# Patient Record
Sex: FEMALE | Race: WHITE | Employment: OTHER | URBAN - METROPOLITAN AREA
[De-identification: names, ages, dates, MRNs, and addresses within clinical notes are randomized per-mention and may not be internally consistent; named-entity substitution may affect disease eponyms.]

---

## 2025-01-29 ENCOUNTER — OFFICE VISIT (OUTPATIENT)
Age: 75
End: 2025-01-29
Payer: MEDICARE

## 2025-01-29 VITALS
TEMPERATURE: 97.4 F | HEIGHT: 62 IN | WEIGHT: 141.8 LBS | BODY MASS INDEX: 26.09 KG/M2 | DIASTOLIC BLOOD PRESSURE: 99 MMHG | SYSTOLIC BLOOD PRESSURE: 161 MMHG | OXYGEN SATURATION: 99 % | HEART RATE: 88 BPM

## 2025-01-29 DIAGNOSIS — R74.8 ABNORMAL LIVER ENZYMES: Primary | ICD-10-CM

## 2025-01-29 PROCEDURE — 1123F ACP DISCUSS/DSCN MKR DOCD: CPT | Performed by: STUDENT IN AN ORGANIZED HEALTH CARE EDUCATION/TRAINING PROGRAM

## 2025-01-29 PROCEDURE — 1036F TOBACCO NON-USER: CPT | Performed by: STUDENT IN AN ORGANIZED HEALTH CARE EDUCATION/TRAINING PROGRAM

## 2025-01-29 PROCEDURE — G8400 PT W/DXA NO RESULTS DOC: HCPCS | Performed by: STUDENT IN AN ORGANIZED HEALTH CARE EDUCATION/TRAINING PROGRAM

## 2025-01-29 PROCEDURE — 1090F PRES/ABSN URINE INCON ASSESS: CPT | Performed by: STUDENT IN AN ORGANIZED HEALTH CARE EDUCATION/TRAINING PROGRAM

## 2025-01-29 PROCEDURE — G8427 DOCREV CUR MEDS BY ELIG CLIN: HCPCS | Performed by: STUDENT IN AN ORGANIZED HEALTH CARE EDUCATION/TRAINING PROGRAM

## 2025-01-29 PROCEDURE — G8419 CALC BMI OUT NRM PARAM NOF/U: HCPCS | Performed by: STUDENT IN AN ORGANIZED HEALTH CARE EDUCATION/TRAINING PROGRAM

## 2025-01-29 PROCEDURE — 99203 OFFICE O/P NEW LOW 30 MIN: CPT | Performed by: STUDENT IN AN ORGANIZED HEALTH CARE EDUCATION/TRAINING PROGRAM

## 2025-01-29 PROCEDURE — 3017F COLORECTAL CA SCREEN DOC REV: CPT | Performed by: STUDENT IN AN ORGANIZED HEALTH CARE EDUCATION/TRAINING PROGRAM

## 2025-01-29 PROCEDURE — 1159F MED LIST DOCD IN RCRD: CPT | Performed by: STUDENT IN AN ORGANIZED HEALTH CARE EDUCATION/TRAINING PROGRAM

## 2025-01-29 RX ORDER — PRAVASTATIN SODIUM 10 MG
10 TABLET ORAL DAILY
COMMUNITY
Start: 2025-01-02

## 2025-01-29 RX ORDER — RABEPRAZOLE SODIUM 20 MG/1
90 TABLET, DELAYED RELEASE ORAL
COMMUNITY

## 2025-01-29 RX ORDER — VALACYCLOVIR HYDROCHLORIDE 1 G/1
TABLET, FILM COATED ORAL
COMMUNITY
Start: 2024-11-25

## 2025-01-29 RX ORDER — URSODIOL 300 MG/1
180 CAPSULE ORAL
COMMUNITY
Start: 2024-03-25

## 2025-01-29 RX ORDER — LEVOTHYROXINE SODIUM 75 MCG
75 TABLET ORAL EVERY MORNING
COMMUNITY
Start: 2024-12-23

## 2025-01-29 RX ORDER — LEVOTHYROXINE SODIUM 88 UG/1
TABLET ORAL
COMMUNITY

## 2025-01-29 ASSESSMENT — ANXIETY QUESTIONNAIRES
2. NOT BEING ABLE TO STOP OR CONTROL WORRYING: NOT AT ALL
1. FEELING NERVOUS, ANXIOUS, OR ON EDGE: NOT AT ALL
5. BEING SO RESTLESS THAT IT IS HARD TO SIT STILL: NOT AT ALL
IF YOU CHECKED OFF ANY PROBLEMS ON THIS QUESTIONNAIRE, HOW DIFFICULT HAVE THESE PROBLEMS MADE IT FOR YOU TO DO YOUR WORK, TAKE CARE OF THINGS AT HOME, OR GET ALONG WITH OTHER PEOPLE: NOT DIFFICULT AT ALL
GAD7 TOTAL SCORE: 0
7. FEELING AFRAID AS IF SOMETHING AWFUL MIGHT HAPPEN: NOT AT ALL
3. WORRYING TOO MUCH ABOUT DIFFERENT THINGS: NOT AT ALL
4. TROUBLE RELAXING: NOT AT ALL
6. BECOMING EASILY ANNOYED OR IRRITABLE: NOT AT ALL

## 2025-01-29 ASSESSMENT — PATIENT HEALTH QUESTIONNAIRE - PHQ9
SUM OF ALL RESPONSES TO PHQ QUESTIONS 1-9: 0
SUM OF ALL RESPONSES TO PHQ QUESTIONS 1-9: 0
SUM OF ALL RESPONSES TO PHQ9 QUESTIONS 1 & 2: 0
DEPRESSION UNABLE TO ASSESS: FUNCTIONAL CAPACITY MOTIVATION LIMITS ACCURACY
2. FEELING DOWN, DEPRESSED OR HOPELESS: NOT AT ALL
SUM OF ALL RESPONSES TO PHQ QUESTIONS 1-9: 0
1. LITTLE INTEREST OR PLEASURE IN DOING THINGS: NOT AT ALL
SUM OF ALL RESPONSES TO PHQ QUESTIONS 1-9: 0

## 2025-01-29 NOTE — PROGRESS NOTES
Chief Complaint   Patient presents with    New Patient     N/A     Vitals:    01/29/25 0856   BP: (!) 161/99   Site: Right Upper Arm   Position: Sitting   Pulse: 88   Temp: 97.4 °F (36.3 °C)   TempSrc: Temporal   SpO2: 99%   Weight: 64.3 kg (141 lb 12.8 oz)   Height: 1.575 m (5' 2\")     .  \"Have you been to the ER, urgent care clinic since your last visit?  Hospitalized since your last visit?\"    NO    “Have you seen or consulted any other health care providers outside of [facility] since your last visit?”    NO    Have you had a mammogram?”   YES - Where: [facility] Nurse/CMA to request most recent records if not in the chart    No breast cancer screening on file         “Have you had a colorectal cancer screening such as a colonoscopy/FIT/Cologuard?    YES - Type: Colonoscopy - Where: with in the past 3 years  Nurse/CMA to request most recent records if not in the chart     No colonoscopy on file  No cologuard on file  No FIT/FOBT on file   No flexible sigmoidoscopy on file         Click Here for Release of Records Request    
[facility] LIVER [facility]     Mason Duque MD, FACP, MACG, FAASLD   MD Flory Banda PA-C April S Ashworth, [facility]-BC   Radha Erazo, [facility]   Alesiaqian Wilson, FNP-C  Cachorro Babin, [facility]-C   Jaylyn Barrientos, [facility]         Liver [facility]   at [facility]   [address], [address]   [place]  [address] [phone]   FAX: [phone]  [facility]   [address], [address]   [place]  [address] [phone]   FAX: [phone]     Gal humberto    Patient Care Team:  Jose Manuel Parikh MD as PCP - General (Gastroenterology)    The clinicians listed above have asked me to see Arnaud Covarrubias in consultation regarding elevated liver enzymes and its management.      All medical records sent by the referring physicians were reviewed including available imaging studies and pathology.        There is no problem list on file for this patient.        ASSESSMENT AND PLAN:    73 yo female with pmhx of asthma, gerd, hypothyroidism, HLD, steatotic liver disease and primary biliary cholangitis.    Primary Biliary Cholangitis  The diagnosis is based upon liver biopsy, a positive AMA and an elevation in ALP.  Many patients with PBC have a positive ORIN in addition to AMA.  A liver biopsy was completed in November 2024 which showed mild bile duct injury and no florid duct lesion (report will be scanned into media)  She had labs two weeks ago and results were reviewed together in clinic today.   She was started on ursodiol in March 2024. Initially prescribed 600 mg but after two weeks, she reduced the dose to 300 mg on her own due to diarrhea. Her alk phos has remained elevated on this dose.     Ms Lares is anti medication. She would like to be off all medications. She is concerned about side effects. We discussed that the 
disease.***    SURGICAL HISTORY:    SOCIAL HISTORY:  Tobacco-.  Never smoker  Alcohol-less than 7 times per week  Drug-none    Lives with  Children-  Works as-    PHYSICAL EXAMINATION:  There were no vitals taken for this visit.  General: No acute distress.   Eyes: Sclera anicteric.   ENT: No oral lesions.  Thyroid normal.  Nodes: No adenopathy.   Skin: No spider angiomata.  No jaundice.  No palmar erythema.  Respiratory: Lungs clear to auscultation.   Cardiovascular: Regular heart rate.  No murmurs.  No JVD.  Abdomen: Soft non-tender.  Liver size normal to percussion/palpation.  Spleen not palpable. No obvious ascites.  Extremities: No edema.  No muscle wasting.  No gross arthritic changes.  Neurologic: Alert and oriented.  Cranial nerves grossly intact.  No asterixis.    LABORATORY STUDIES:  10/16/2024  Albumin 4.5, T. bili 1.2, alkaline phosphatase 151, AST 25, ALT 15    6/10/2024 alk phos 146  1/5/2024 alk phos 182  11/29/2023 alk phos 160    2024 alk phos of 5.5, hemoglobin 14, platelet 354    3/18/2024 alpha-fetoprotein 3.0, CA 19-9 9,    SEROLOGIES:  10 /16/2024  Smooth muscle antibody 45 (high), mitochondrial antibody 186 (high)    ORIN negative, ceruloplasmin 24 bilirubin 9 point    7/10/2023  Hepatitis A IgM negative, hepatitis B surface antigen negative, hepatitis B core IgM negative, hepatitis C antibody nonreactive    LIVER HISTOLOGY:  Not available or performed    ENDOSCOPIC PROCEDURES:  Colonoscopy 2/6/2019-diverticulosis, subcentimeter polyp removed in the transverse colon.  EGD pending 2/6/2019: Hiatal hernia, esophagitis, nodularity of the antrum    RADIOLOGY:  CT abdomen with and without IV contrast 7/13/2023  The liver is normal in size there are no focal liver lesions, the spleen is normal in size with no focal liver lesions  Impression enlarged jamie hepatis adenopathy, indeterminate.  Differentials include reactive lymphoproliferative disorder or sequela of hepatocellular dysfunction

## 2025-04-01 DIAGNOSIS — R74.8 ABNORMAL LIVER ENZYMES: ICD-10-CM

## 2025-04-02 LAB
ALBUMIN SERPL-MCNC: 3.8 G/DL (ref 3.5–5)
ALBUMIN/GLOB SERPL: 0.9 (ref 1.1–2.2)
ALP SERPL-CCNC: 149 U/L (ref 45–117)
ALT SERPL-CCNC: 19 U/L (ref 12–78)
ANION GAP SERPL CALC-SCNC: 6 MMOL/L (ref 2–12)
AST SERPL-CCNC: 24 U/L (ref 15–37)
BASOPHILS # BLD: 0.05 K/UL (ref 0–0.1)
BASOPHILS NFR BLD: 0.9 % (ref 0–1)
BILIRUB DIRECT SERPL-MCNC: 0.3 MG/DL (ref 0–0.2)
BILIRUB SERPL-MCNC: 1.3 MG/DL (ref 0.2–1)
BUN SERPL-MCNC: 10 MG/DL (ref 6–20)
BUN/CREAT SERPL: 15 (ref 12–20)
CALCIUM SERPL-MCNC: 9.8 MG/DL (ref 8.5–10.1)
CHLORIDE SERPL-SCNC: 104 MMOL/L (ref 97–108)
CHOLEST SERPL-MCNC: 223 MG/DL
CO2 SERPL-SCNC: 29 MMOL/L (ref 21–32)
CREAT SERPL-MCNC: 0.65 MG/DL (ref 0.55–1.02)
DIFFERENTIAL METHOD BLD: NORMAL
EOSINOPHIL # BLD: 0.22 K/UL (ref 0–0.4)
EOSINOPHIL NFR BLD: 3.9 % (ref 0–7)
ERYTHROCYTE [DISTWIDTH] IN BLOOD BY AUTOMATED COUNT: 13 % (ref 11.5–14.5)
GLOBULIN SER CALC-MCNC: 4.1 G/DL (ref 2–4)
GLUCOSE SERPL-MCNC: 94 MG/DL (ref 65–100)
HCT VFR BLD AUTO: 45.8 % (ref 35–47)
HDLC SERPL-MCNC: 61 MG/DL
HDLC SERPL: 3.7 (ref 0–5)
HGB BLD-MCNC: 14.5 G/DL (ref 11.5–16)
IMM GRANULOCYTES # BLD AUTO: 0.03 K/UL (ref 0–0.04)
IMM GRANULOCYTES NFR BLD AUTO: 0.5 % (ref 0–0.5)
LDLC SERPL CALC-MCNC: 133.6 MG/DL (ref 0–100)
LYMPHOCYTES # BLD: 2.39 K/UL (ref 0.8–3.5)
LYMPHOCYTES NFR BLD: 42.6 % (ref 12–49)
MCH RBC QN AUTO: 30 PG (ref 26–34)
MCHC RBC AUTO-ENTMCNC: 31.7 G/DL (ref 30–36.5)
MCV RBC AUTO: 94.6 FL (ref 80–99)
MONOCYTES # BLD: 0.64 K/UL (ref 0–1)
MONOCYTES NFR BLD: 11.4 % (ref 5–13)
NEUTS SEG # BLD: 2.28 K/UL (ref 1.8–8)
NEUTS SEG NFR BLD: 40.7 % (ref 32–75)
NRBC # BLD: 0 K/UL (ref 0–0.01)
NRBC BLD-RTO: 0 PER 100 WBC
PLATELET # BLD AUTO: 267 K/UL (ref 150–400)
PMV BLD AUTO: 12.3 FL (ref 8.9–12.9)
POTASSIUM SERPL-SCNC: 4.4 MMOL/L (ref 3.5–5.1)
PROT SERPL-MCNC: 7.9 G/DL (ref 6.4–8.2)
RBC # BLD AUTO: 4.84 M/UL (ref 3.8–5.2)
SODIUM SERPL-SCNC: 139 MMOL/L (ref 136–145)
TRIGL SERPL-MCNC: 142 MG/DL
VLDLC SERPL CALC-MCNC: 28.4 MG/DL
WBC # BLD AUTO: 5.6 K/UL (ref 3.6–11)

## 2025-04-03 LAB
MITOCHONDRIA M2 IGG SER-ACNC: 141.3 UNITS (ref 0–20)
SMA IGG SER-ACNC: 34 UNITS (ref 0–19)

## 2025-04-08 DIAGNOSIS — K74.3 PRIMARY BILIARY CHOLANGITIS (HCC): Primary | ICD-10-CM

## 2025-04-08 NOTE — PROGRESS NOTES
She called to discuss results.  She is not tolerating ursodiol  Prescription for seladelpar sent to [facility]. Follow up in July as scheduled.

## 2025-04-10 LAB
IGA SERPL-MCNC: 436 MG/DL (ref 64–422)
IGE SERPL-ACNC: 311 IU/ML (ref 6–495)
IGG SERPL-MCNC: 1261 MG/DL (ref 586–1602)
IGM SERPL-MCNC: 436 MG/DL (ref 26–217)

## 2025-04-11 LAB
ANA BY IFA: NEGATIVE
ANTI SSA: <20 UNITS

## 2025-04-18 DIAGNOSIS — K74.3 PRIMARY BILIARY CHOLANGITIS (HCC): Primary | ICD-10-CM

## 2025-04-23 DIAGNOSIS — K74.3 PRIMARY BILIARY CHOLANGITIS (HCC): ICD-10-CM

## 2025-05-19 ENCOUNTER — OFFICE VISIT (OUTPATIENT)
Age: 75
End: 2025-05-19
Payer: MEDICARE

## 2025-05-19 VITALS
WEIGHT: 135.2 LBS | HEART RATE: 79 BPM | HEIGHT: 62 IN | OXYGEN SATURATION: 99 % | SYSTOLIC BLOOD PRESSURE: 140 MMHG | DIASTOLIC BLOOD PRESSURE: 93 MMHG | BODY MASS INDEX: 24.88 KG/M2 | TEMPERATURE: 97.5 F

## 2025-05-19 DIAGNOSIS — K74.3 PRIMARY BILIARY CHOLANGITIS (HCC): Primary | ICD-10-CM

## 2025-05-19 PROCEDURE — 91200 LIVER ELASTOGRAPHY: CPT | Performed by: STUDENT IN AN ORGANIZED HEALTH CARE EDUCATION/TRAINING PROGRAM

## 2025-05-19 PROCEDURE — 1159F MED LIST DOCD IN RCRD: CPT | Performed by: STUDENT IN AN ORGANIZED HEALTH CARE EDUCATION/TRAINING PROGRAM

## 2025-05-19 PROCEDURE — 1126F AMNT PAIN NOTED NONE PRSNT: CPT | Performed by: STUDENT IN AN ORGANIZED HEALTH CARE EDUCATION/TRAINING PROGRAM

## 2025-05-19 PROCEDURE — 1123F ACP DISCUSS/DSCN MKR DOCD: CPT | Performed by: STUDENT IN AN ORGANIZED HEALTH CARE EDUCATION/TRAINING PROGRAM

## 2025-05-19 PROCEDURE — G8400 PT W/DXA NO RESULTS DOC: HCPCS | Performed by: STUDENT IN AN ORGANIZED HEALTH CARE EDUCATION/TRAINING PROGRAM

## 2025-05-19 PROCEDURE — 1036F TOBACCO NON-USER: CPT | Performed by: STUDENT IN AN ORGANIZED HEALTH CARE EDUCATION/TRAINING PROGRAM

## 2025-05-19 PROCEDURE — 1090F PRES/ABSN URINE INCON ASSESS: CPT | Performed by: STUDENT IN AN ORGANIZED HEALTH CARE EDUCATION/TRAINING PROGRAM

## 2025-05-19 PROCEDURE — 99214 OFFICE O/P EST MOD 30 MIN: CPT | Performed by: STUDENT IN AN ORGANIZED HEALTH CARE EDUCATION/TRAINING PROGRAM

## 2025-05-19 PROCEDURE — 3017F COLORECTAL CA SCREEN DOC REV: CPT | Performed by: STUDENT IN AN ORGANIZED HEALTH CARE EDUCATION/TRAINING PROGRAM

## 2025-05-19 PROCEDURE — G8420 CALC BMI NORM PARAMETERS: HCPCS | Performed by: STUDENT IN AN ORGANIZED HEALTH CARE EDUCATION/TRAINING PROGRAM

## 2025-05-19 PROCEDURE — G8427 DOCREV CUR MEDS BY ELIG CLIN: HCPCS | Performed by: STUDENT IN AN ORGANIZED HEALTH CARE EDUCATION/TRAINING PROGRAM

## 2025-05-19 RX ORDER — URSODIOL 250 MG/1
250 TABLET, FILM COATED ORAL 2 TIMES DAILY
Qty: 90 TABLET | Refills: 3 | Status: SHIPPED | OUTPATIENT
Start: 2025-05-19

## 2025-05-19 ASSESSMENT — PATIENT HEALTH QUESTIONNAIRE - PHQ9
SUM OF ALL RESPONSES TO PHQ QUESTIONS 1-9: 0
SUM OF ALL RESPONSES TO PHQ QUESTIONS 1-9: 0
1. LITTLE INTEREST OR PLEASURE IN DOING THINGS: NOT AT ALL
DEPRESSION UNABLE TO ASSESS: FUNCTIONAL CAPACITY MOTIVATION LIMITS ACCURACY
SUM OF ALL RESPONSES TO PHQ QUESTIONS 1-9: 0
SUM OF ALL RESPONSES TO PHQ QUESTIONS 1-9: 0
2. FEELING DOWN, DEPRESSED OR HOPELESS: NOT AT ALL

## 2025-05-19 NOTE — PROGRESS NOTES
Chief Complaint   Patient presents with    Follow-up     Vitals:    05/19/25 1213   BP: (!) 140/93   Pulse: 79   Temp: 97.5 °F (36.4 °C)   SpO2: 99%     Have you been to the ER, urgent care clinic since your last visit?  Hospitalized since your last visit?   NO    Have you seen or consulted any other health care providers outside our system since your last visit?   NO    Have you had a mammogram?”   NO    No breast cancer screening on file       “Have you had a colorectal cancer screening such as a colonoscopy/FIT/Cologuard?    NO    No colonoscopy on file  No cologuard on file  No FIT/FOBT on file   No flexible sigmoidoscopy on file            
[employer].    LABORATORY STUDIES:  1/2025  Albumin 4.2, alkaline phosphatase 129, alt 14, ast 20     10/16/2024  Albumin 4.5, T. bili 1.2, alkaline phosphatase 151, AST 25, ALT 15    6/10/2024 alk phos 146  1/5/2024 alk phos 182  11/29/2023 alk phos 160    2024 alk phos of 5.5, hemoglobin 14, platelet 354    3/18/2024 alpha-fetoprotein 3.0, CA 19-9 9,    7/7/2023  Alp 312, ast 703, alt 690, t. Bili 1.6    SEROLOGIES:  10 /16/2024  Smooth muscle antibody 45 (high), mitochondrial antibody 186 (high)    ORIN negative, ceruloplasmin 24 bilirubin 9 point    7/10/2023  Hepatitis A IgM negative, hepatitis B surface antigen negative, hepatitis B core IgM negative, hepatitis C antibody nonreactive    LIVER HISTOLOGY:  Liver biopsy November 2024. Results shared with me during clinic visit    ENDOSCOPIC PROCEDURES:  Colonoscopy 2/6/2019-diverticulosis, subcentimeter polyp removed in the transverse colon.  EGD pending 2/6/2019: Hiatal hernia, esophagitis, nodularity of the antrum    RADIOLOGY:  CT abdomen with and without IV contrast 7/13/2023  The liver is normal in size there are no focal liver lesions, the spleen is normal in size with no focal liver lesions  Impression enlarged jamie hepatis adenopathy, indeterminate.  Differentials include reactive lymphoproliferative disorder or sequela of hepatocellular dysfunction recommend consideration for repeat scan in 3 to 6 months.    Ultrasound 7/14/2023  There is minimal hepatic steatosis    FIBROSIS ASSESSMENT  Periportal fibrosis on biopsy in 11/2024    Fibroscan 5/19/2025:  LSM 4.9 kPa; ; IQR 11%    OTHER TESTING:  Not available or performed      FOLLOW-UP:  All of the issues listed above in the Assessment and Plan were discussed with the patient.  All questions were answered.  The patient expressed a clear understanding of the above.    Follow-up Liver [facility] in 6 months for routine monitoring of her alk phos.       Mercedes Lim MD

## 2025-05-21 ENCOUNTER — PATIENT MESSAGE (OUTPATIENT)
Age: 75
End: 2025-05-21

## 2025-05-21 DIAGNOSIS — K21.9 GASTROESOPHAGEAL REFLUX DISEASE WITHOUT ESOPHAGITIS: Primary | ICD-10-CM

## 2025-05-22 RX ORDER — RABEPRAZOLE SODIUM 20 MG/1
20 TABLET, DELAYED RELEASE ORAL DAILY
Qty: 30 TABLET | Refills: 3 | Status: SHIPPED | OUTPATIENT
Start: 2025-05-22

## 2025-07-30 ENCOUNTER — RESULTS FOLLOW-UP (OUTPATIENT)
Age: 75
End: 2025-07-30

## 2025-07-30 DIAGNOSIS — K74.3 PRIMARY BILIARY CHOLANGITIS (HCC): Primary | ICD-10-CM
